# Patient Record
(demographics unavailable — no encounter records)

---

## 2025-05-06 NOTE — PLAN
[FreeTextEntry1] : Right inguinal hernia reducible amenable to an open right inguinal hernia repair mesh frees Shouldice technique.   Surgical options including watchful waiting have been discussed with patient at length.  I reported  that the data shows that watchful waiting may actually eventually convert over [id number]% of patients having a surgical intervention.  The best time for surgery is usually during a window of good health.  Surgical options include open, no mesh, with mesh, minimally invasive - meaning laparoscopic or robotic with mesh.  Detailed explanations and a discussion was undertaken with the patient.  Questions have been answered to their satisfaction. Risks, benefits and contraindications were discussed with him at length, including, but not exclusive to bleeding, infection, nerve/vessel injury, surgically transecting the cremasterics including the genital branch of the gentofemoral nerve  which runs in the cremasterics, recurrance rates, chronic pain, sutures used, heart attach, stroke, blood clots, more surgery, death Preoperative labs will be ordered, including cbc, cmp, ekg, in addition to clearance from primary and other specialists as needed.  Preparations and expectations reviewed for preop, perioperative and postoperative discussed in detail, including, pain management, showering, no swimming or bathing for 10 days, follow up in my office in 2-4 weeks, diet recommendations, activity recommendations and return to work.

## 2025-05-06 NOTE — PHYSICAL EXAM
[Normal Breath Sounds] : Normal breath sounds [Normal Heart Sounds] : normal heart sounds [No Rash or Lesion] : No rash or lesion [Purpura] : no purpura  [Petechiae] : no petechiae [Skin Ulcer] : no ulcer [Skin Induration] : no induration [Alert] : alert [Oriented to Person] : oriented to person [Oriented to Place] : oriented to place [Oriented to Time] : oriented to time [Calm] : calm [de-identified] : No acute distress [de-identified] : PERRL EOMI anicteric sclera pink moist oral mucosa [de-identified] : Supple [de-identified] : Right groin bulge reducible.  No obvious left inguinal bulge on Valsalva.  No obvious umbilical bulge but there is a small defect about 3 mm in size this area is nontender.  There is no epigastric bulges on Valsalva. [de-identified] :   Genitalia appropriate for age and gender, 2 testicles in the scrotal sac.

## 2025-05-06 NOTE — REASON FOR VISIT
[Initial Evaluation] : an initial evaluation [FreeTextEntry1] : Right groin bulge for a couple of months at least.  He denies any obstructive symptoms.  It has increased in size and discomfort.  He desires a pure tissue repair.

## 2025-06-18 NOTE — PHYSICAL EXAM
[de-identified] : No acute distress [de-identified] : Reports that he has a ridge which is expected.  He denies any swelling or redness or increased pain in that area.  He reports that the ecchymosis has completely resolved.  And he has good sensation across the whole area. [de-identified] : He reports 2 testicles in the scrotal sac.

## 2025-06-18 NOTE — PLAN
[FreeTextEntry1] : Status post open right inguinal hernia repair mesh frees Shouldice technique patient reassured that the hernia repair is structurally sound based on today's encounter.  He may resume activities of daily living to full capacity in an incremental fashion.  Follow-up in 6 months and then annually.  Sooner if indicated.

## 2025-06-18 NOTE — REASON FOR VISIT
[Home] : at home, [unfilled] , at the time of the visit. [Medical Office: ([facility])___] : at the medical office located in  [Telehealth (audio & video)] : This visit was provided via telehealth using real-time 2-way audio visual technology. [Verbal consent obtained from patient] : the patient, [unfilled] [Post Op: _________] : a [unfilled] post op visit [FreeTextEntry1] : Status post open right inguinal hernia repair mesh gerardo Santiago May 22, 2025 presents in a telehealth follow-up.  Reports no pain today.  Reports that the bruising has cleared.  He does have a healing ridge which is expected.  And he is back to his activities of daily living. [FreeTextEntry2] : May 22, 2025